# Patient Record
Sex: FEMALE | Race: WHITE | NOT HISPANIC OR LATINO | URBAN - METROPOLITAN AREA
[De-identification: names, ages, dates, MRNs, and addresses within clinical notes are randomized per-mention and may not be internally consistent; named-entity substitution may affect disease eponyms.]

---

## 2017-10-06 ENCOUNTER — AMBULATORY SURGICAL CENTER (OUTPATIENT)
Dept: URBAN - METROPOLITAN AREA SURGERY 3 | Facility: SURGERY | Age: 67
End: 2017-10-06
Payer: MEDICARE

## 2017-10-06 VITALS
HEART RATE: 76 BPM | OXYGEN SATURATION: 95 % | RESPIRATION RATE: 18 BRPM | HEART RATE: 76 BPM | OXYGEN SATURATION: 94 % | HEART RATE: 68 BPM | RESPIRATION RATE: 16 BRPM | SYSTOLIC BLOOD PRESSURE: 99 MMHG | OXYGEN SATURATION: 94 % | DIASTOLIC BLOOD PRESSURE: 59 MMHG | DIASTOLIC BLOOD PRESSURE: 75 MMHG | OXYGEN SATURATION: 96 % | SYSTOLIC BLOOD PRESSURE: 99 MMHG | RESPIRATION RATE: 15 BRPM | OXYGEN SATURATION: 95 % | HEART RATE: 71 BPM | DIASTOLIC BLOOD PRESSURE: 68 MMHG | SYSTOLIC BLOOD PRESSURE: 108 MMHG | HEART RATE: 66 BPM | DIASTOLIC BLOOD PRESSURE: 75 MMHG | DIASTOLIC BLOOD PRESSURE: 68 MMHG | WEIGHT: 175 LBS | HEART RATE: 68 BPM | OXYGEN SATURATION: 93 % | SYSTOLIC BLOOD PRESSURE: 108 MMHG | RESPIRATION RATE: 15 BRPM | RESPIRATION RATE: 18 BRPM | SYSTOLIC BLOOD PRESSURE: 155 MMHG | TEMPERATURE: 97 F | HEART RATE: 78 BPM | WEIGHT: 175 LBS | SYSTOLIC BLOOD PRESSURE: 105 MMHG | DIASTOLIC BLOOD PRESSURE: 59 MMHG | HEART RATE: 71 BPM | SYSTOLIC BLOOD PRESSURE: 155 MMHG | TEMPERATURE: 97.1 F | HEART RATE: 78 BPM | OXYGEN SATURATION: 93 % | DIASTOLIC BLOOD PRESSURE: 44 MMHG | HEIGHT: 65 IN | TEMPERATURE: 97.1 F | TEMPERATURE: 97 F | OXYGEN SATURATION: 96 % | DIASTOLIC BLOOD PRESSURE: 44 MMHG | HEART RATE: 66 BPM | RESPIRATION RATE: 16 BRPM | HEIGHT: 65 IN | SYSTOLIC BLOOD PRESSURE: 105 MMHG

## 2017-10-06 DIAGNOSIS — Z12.11 ENCOUNTER FOR SCREENING FOR MALIGNANT NEOPLASM OF COLON: ICD-10-CM

## 2017-10-06 PROCEDURE — G8918 PT W/O PREOP ORDER IV AB PRO: HCPCS

## 2017-10-06 PROCEDURE — G8907 PT DOC NO EVENTS ON DISCHARG: HCPCS

## 2019-07-02 ENCOUNTER — OFFICE (OUTPATIENT)
Dept: URBAN - METROPOLITAN AREA CLINIC 11 | Facility: CLINIC | Age: 69
End: 2019-07-02

## 2019-07-02 VITALS
DIASTOLIC BLOOD PRESSURE: 81 MMHG | WEIGHT: 167 LBS | HEIGHT: 65 IN | HEART RATE: 84 BPM | SYSTOLIC BLOOD PRESSURE: 141 MMHG

## 2019-07-02 DIAGNOSIS — R10.13 EPIGASTRIC PAIN: ICD-10-CM

## 2019-07-02 DIAGNOSIS — R10.10 UPPER ABDOMINAL PAIN, UNSPECIFIED: ICD-10-CM

## 2019-07-02 DIAGNOSIS — M12.9 ARTHROPATHY, UNSPECIFIED: ICD-10-CM

## 2019-07-02 LAB
FE+TIBC+FER: FERRITIN, SERUM: 78 NG/ML (ref 15–150)
FE+TIBC+FER: IRON BIND.CAP.(TIBC): 310 UG/DL (ref 250–450)
FE+TIBC+FER: IRON SATURATION: 32 % (ref 15–55)
FE+TIBC+FER: IRON: 98 UG/DL (ref 27–139)
FE+TIBC+FER: UIBC: 212 UG/DL (ref 118–369)
H PYLORI, IGM, IGG, IGA AB: H PYLORI, IGM ABS: <9 UNITS
H PYLORI, IGM, IGG, IGA AB: H. PYLORI, IGA ABS: <9 UNITS
H PYLORI, IGM, IGG, IGA AB: H. PYLORI, IGG ABS: 0.53 INDEX VALUE (ref 0–0.79)

## 2019-07-02 PROCEDURE — 99214 OFFICE O/P EST MOD 30 MIN: CPT

## 2019-07-02 RX ORDER — DICLOFENAC 10 MG/G
GEL TOPICAL
Qty: 1 | Refills: 0 | Status: ACTIVE
Start: 2019-07-02

## 2019-07-02 RX ORDER — DICLOFENAC 10 MG/G
GEL TOPICAL
Qty: 1 | Refills: 0 | Status: COMPLETED
Start: 2019-07-02 | End: 2021-03-09 | Stop reason: SDUPTHER

## 2019-07-02 NOTE — SERVICENOTES
In light of her arthritis pain in her hands, will also check iron profile to rule out underlying hemochromatosis. Given resolution of her pain with cessation of meloxicam and starting omeprazole, the upper abdominal pain is most likely secondary to long-term NSAID use without adequate GI prophylaxis. Will also check H. pylori serology to rule out concomitant infection.

## 2019-07-02 NOTE — SERVICEHPINOTES
Mrs. Perez is here for upper abdominal pain that first began about 1.5-2 months ago. It was progressive in frequency until she noticed it was mostly constant. She has been taking meloxicam 15 mg daily for about 2 years for arthritis pain. The week of June 7th, she stopped the meloxicam and started taking omeprazole 20 mg for 14 days. She has since had resolution in the abdominal pain. 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 The meloxicam controls her pain well. She plans to see physical therapy for her back. She has used OTC Arnicare gel on her joints, which decrease but not resolve the pain. She also has seen an Accupuncturist. She is interested in trying CBD for joint pain.

## 2021-03-05 ENCOUNTER — OFFICE (OUTPATIENT)
Dept: URBAN - METROPOLITAN AREA CLINIC 11 | Facility: CLINIC | Age: 71
End: 2021-03-05

## 2021-03-05 VITALS
DIASTOLIC BLOOD PRESSURE: 61 MMHG | SYSTOLIC BLOOD PRESSURE: 148 MMHG | WEIGHT: 165 LBS | HEART RATE: 95 BPM | OXYGEN SATURATION: 98 % | HEIGHT: 65 IN

## 2021-03-05 DIAGNOSIS — R10.10 UPPER ABDOMINAL PAIN, UNSPECIFIED: ICD-10-CM

## 2021-03-05 DIAGNOSIS — R10.13 EPIGASTRIC PAIN: ICD-10-CM

## 2021-03-05 PROCEDURE — 99213 OFFICE O/P EST LOW 20 MIN: CPT

## 2021-03-05 RX ORDER — LANSOPRAZOLE 30 MG/1
30 CAPSULE, DELAYED RELEASE PELLETS ORAL
Qty: 21 | Refills: 0 | Status: COMPLETED
Start: 2021-03-05 | End: 2021-03-09 | Stop reason: SDUPTHER

## 2021-03-05 RX ORDER — LANSOPRAZOLE 30 MG/1
30 CAPSULE, DELAYED RELEASE PELLETS ORAL
Qty: 90 | Refills: 0 | Status: ACTIVE
Start: 2021-03-05

## 2021-03-09 ENCOUNTER — OFFICE (OUTPATIENT)
Dept: URBAN - METROPOLITAN AREA PATHOLOGY 22 | Facility: PATHOLOGY | Age: 71
End: 2021-03-09

## 2021-03-09 ENCOUNTER — AMBULATORY SURGICAL CENTER (OUTPATIENT)
Dept: URBAN - METROPOLITAN AREA SURGERY 3 | Facility: SURGERY | Age: 71
End: 2021-03-09

## 2021-03-09 ENCOUNTER — AMBULATORY SURGICAL CENTER (OUTPATIENT)
Dept: URBAN - METROPOLITAN AREA SURGERY 3 | Facility: SURGERY | Age: 71
End: 2021-03-09
Payer: MEDICARE

## 2021-03-09 VITALS
RESPIRATION RATE: 20 BRPM | WEIGHT: 165 LBS | DIASTOLIC BLOOD PRESSURE: 71 MMHG | HEART RATE: 70 BPM | WEIGHT: 165 LBS | OXYGEN SATURATION: 98 % | DIASTOLIC BLOOD PRESSURE: 61 MMHG | OXYGEN SATURATION: 98 % | DIASTOLIC BLOOD PRESSURE: 66 MMHG | DIASTOLIC BLOOD PRESSURE: 62 MMHG | HEIGHT: 65 IN | RESPIRATION RATE: 16 BRPM | RESPIRATION RATE: 16 BRPM | SYSTOLIC BLOOD PRESSURE: 133 MMHG | DIASTOLIC BLOOD PRESSURE: 64 MMHG | OXYGEN SATURATION: 97 % | HEART RATE: 77 BPM | HEART RATE: 68 BPM | HEART RATE: 72 BPM | OXYGEN SATURATION: 98 % | WEIGHT: 165 LBS | DIASTOLIC BLOOD PRESSURE: 66 MMHG | HEART RATE: 68 BPM | TEMPERATURE: 97.6 F | HEIGHT: 65 IN | SYSTOLIC BLOOD PRESSURE: 148 MMHG | DIASTOLIC BLOOD PRESSURE: 64 MMHG | TEMPERATURE: 98.2 F | DIASTOLIC BLOOD PRESSURE: 61 MMHG | TEMPERATURE: 97.6 F | DIASTOLIC BLOOD PRESSURE: 66 MMHG | SYSTOLIC BLOOD PRESSURE: 119 MMHG | SYSTOLIC BLOOD PRESSURE: 148 MMHG | HEART RATE: 77 BPM | HEART RATE: 70 BPM | HEIGHT: 65 IN | OXYGEN SATURATION: 96 % | SYSTOLIC BLOOD PRESSURE: 133 MMHG | SYSTOLIC BLOOD PRESSURE: 148 MMHG | DIASTOLIC BLOOD PRESSURE: 71 MMHG | HEART RATE: 74 BPM | HEART RATE: 72 BPM | SYSTOLIC BLOOD PRESSURE: 122 MMHG | HEART RATE: 72 BPM | SYSTOLIC BLOOD PRESSURE: 122 MMHG | HEART RATE: 74 BPM | RESPIRATION RATE: 22 BRPM | RESPIRATION RATE: 22 BRPM | OXYGEN SATURATION: 97 % | SYSTOLIC BLOOD PRESSURE: 122 MMHG | RESPIRATION RATE: 22 BRPM | TEMPERATURE: 97.6 F | HEART RATE: 68 BPM | SYSTOLIC BLOOD PRESSURE: 119 MMHG | SYSTOLIC BLOOD PRESSURE: 107 MMHG | SYSTOLIC BLOOD PRESSURE: 107 MMHG | DIASTOLIC BLOOD PRESSURE: 61 MMHG | TEMPERATURE: 98.2 F | HEART RATE: 74 BPM | DIASTOLIC BLOOD PRESSURE: 64 MMHG | OXYGEN SATURATION: 96 % | TEMPERATURE: 98.2 F | RESPIRATION RATE: 16 BRPM | SYSTOLIC BLOOD PRESSURE: 119 MMHG | HEART RATE: 77 BPM | RESPIRATION RATE: 20 BRPM | DIASTOLIC BLOOD PRESSURE: 62 MMHG | OXYGEN SATURATION: 96 % | HEART RATE: 70 BPM | OXYGEN SATURATION: 97 % | SYSTOLIC BLOOD PRESSURE: 133 MMHG | SYSTOLIC BLOOD PRESSURE: 107 MMHG | DIASTOLIC BLOOD PRESSURE: 71 MMHG | DIASTOLIC BLOOD PRESSURE: 62 MMHG | RESPIRATION RATE: 20 BRPM

## 2021-03-09 DIAGNOSIS — K29.50 UNSPECIFIED CHRONIC GASTRITIS WITHOUT BLEEDING: ICD-10-CM

## 2021-03-09 DIAGNOSIS — K26.9 DUODENAL ULCER, UNSPECIFIED AS ACUTE OR CHRONIC, WITHOUT HEM: ICD-10-CM

## 2021-03-09 DIAGNOSIS — R10.13 EPIGASTRIC PAIN: ICD-10-CM

## 2021-03-09 PROBLEM — K31.89 OTHER DISEASES OF STOMACH AND DUODENUM: Status: ACTIVE | Noted: 2021-03-09

## 2021-03-09 PROCEDURE — 43239 EGD BIOPSY SINGLE/MULTIPLE: CPT

## 2021-03-09 PROCEDURE — 88305 TISSUE EXAM BY PATHOLOGIST: CPT

## 2021-03-09 PROCEDURE — G8918 PT W/O PREOP ORDER IV AB PRO: HCPCS

## 2021-03-09 PROCEDURE — 88313 SPECIAL STAINS GROUP 2: CPT

## 2021-03-09 PROCEDURE — 88342 IMHCHEM/IMCYTCHM 1ST ANTB: CPT

## 2021-03-09 PROCEDURE — G8907 PT DOC NO EVENTS ON DISCHARG: HCPCS

## 2021-03-09 NOTE — SERVICEHPINOTES
71 y/o female who started to have upper abdominal pain in early February. It is difficult for her to describe other than "just a pain" but could be quite severe at times and lasting about an hour. The pain is diffusely located in her upper abdomen from umbilicus to costal margin and can sometimes radiate to lower sternum. It could occur multiple times a day in no predictable pattern. It worsened in mid February. There is increased gas. Bowel movements overall remain normal and she denies melena or hematochezia. No pyrosis or regurgitation. At the recommendation of her son, who is a surgeon, she stopped the meloxicam around 2/21. She stopped the Fosamax and some vitamins at that same time. Around the same time, she started taking Gas-X after meals &amp Pepto Bismol as needed for severe symptoms. She also switched to a bland diet and avoiding foods that ma cause irritation to her stomach. Today is the first day without severe pain and needing to take Pepto. Within the last couple weeks, she has completed labs and abdominal ultrasound at her PCP with no findings to explain her pain per patient. She has been on meloxicam for years for arthritis in her right foot. She is scheduled for back surgery at the end of the month.

## 2022-08-31 NOTE — SERVICEHPINOTES
69 y/o female who started to have upper abdominal pain in early February. It is difficult for her to describe other than "just a pain" but could be quite severe at times and lasting about an hour. The pain is diffusely located in her upper abdomen from umbilicus to costal margin and can sometimes radiate to lower sternum. It could occur multiple times a day in no predictable pattern. It worsened in mid February. There is increased gas. Bowel movements overall remain normal and she denies melena or hematochezia. No pyrosis or regurgitation. At the recommendation of her son, who is a surgeon, she stopped the meloxicam around 2/21. She stopped the Fosamax and some vitamins at that same time. Around the same time, she started taking Gas-X after meals &amp Pepto Bismol as needed for severe symptoms. She also switched to a bland diet and avoiding foods that ma cause irritation to her stomach. Today is the first day without severe pain and needing to take Pepto. Within the last couple weeks, she has completed labs and abdominal ultrasound at her PCP with no findings to explain her pain per patient. She has been on meloxicam for years for arthritis in her right foot. She is scheduled for back surgery at the end of the month.  Home